# Patient Record
Sex: FEMALE
[De-identification: names, ages, dates, MRNs, and addresses within clinical notes are randomized per-mention and may not be internally consistent; named-entity substitution may affect disease eponyms.]

---
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
## 2018-06-04 ENCOUNTER — APPOINTMENT (OUTPATIENT)
Dept: INTERNAL MEDICINE | Facility: CLINIC | Age: 18
End: 2018-06-04

## 2019-01-04 ENCOUNTER — APPOINTMENT (OUTPATIENT)
Dept: INTERNAL MEDICINE | Facility: CLINIC | Age: 19
End: 2019-01-04
Payer: SELF-PAY

## 2019-01-04 VITALS — RESPIRATION RATE: 12 BRPM | DIASTOLIC BLOOD PRESSURE: 64 MMHG | HEART RATE: 72 BPM | SYSTOLIC BLOOD PRESSURE: 94 MMHG

## 2019-01-04 VITALS — BODY MASS INDEX: 28.17 KG/M2 | WEIGHT: 165 LBS | HEIGHT: 64 IN

## 2019-01-04 DIAGNOSIS — K65.9 PERITONITIS, UNSPECIFIED: ICD-10-CM

## 2019-01-04 DIAGNOSIS — Z78.9 OTHER SPECIFIED HEALTH STATUS: ICD-10-CM

## 2019-01-04 DIAGNOSIS — R10.31 RIGHT LOWER QUADRANT PAIN: ICD-10-CM

## 2019-01-04 PROCEDURE — 99213 OFFICE O/P EST LOW 20 MIN: CPT

## 2019-01-04 NOTE — HISTORY OF PRESENT ILLNESS
[FreeTextEntry1] : c/o sharp R sided pain and bloating several mos ago then got diarrhea x 2 wks.  OK till last PM when she had some R sided pain but not as severe.  Normal BM.  Concerned that she has an AP.  No fever chills.  \par Last GYn exam 5-18 was normal.  No known ovarian cysts.  No hx endometriosis.  \par No urinary sx's or bleeding.

## 2019-01-04 NOTE — HEALTH RISK ASSESSMENT
[No falls in past year] : Patient reported no falls in the past year [0] : 2) Feeling down, depressed, or hopeless: Not at all (0) [] : No [de-identified] : Rare social [[id number]] : 0

## 2019-01-04 NOTE — REVIEW OF SYSTEMS
[Headache] : headache [Negative] : Heme/Lymph [FreeTextEntry2] : see HPI [FreeTextEntry7] : see HPI [FreeTextEntry8] : see HPI [de-identified] : Had headache yesterday and "Migraine relief" (Excedrin)

## 2019-01-04 NOTE — PHYSICAL EXAM

## 2019-01-04 NOTE — ASSESSMENT
[FreeTextEntry1] : R sided abd pain.  More severe 2 mos ago.  Had diarrhea x 2 wks.  Not sure if related to menses.  No urinary sx's.  Generally normal BM's.  No sexually active.  Differential includes ruptured ovarian cyst and peritonitis vs endometriosis.  Doubt an acute or recurrent AP.  Needs abd sono and suggest GYN exam.  Pt has GYN in [place].\par Has no health ins but mother has a full time job.  \par It is possible that pt could go back to [facility] where she has student health ins.\par

## 2019-01-07 ENCOUNTER — APPOINTMENT (OUTPATIENT)
Dept: INTERNAL MEDICINE | Facility: CLINIC | Age: 19
End: 2019-01-07

## 2020-12-15 PROBLEM — Z11.1 ENCOUNTER FOR PPD TEST: Status: RESOLVED | Noted: 2017-10-31 | Resolved: 2020-12-15

## 2025-04-12 ENCOUNTER — TRANSCRIPTION ENCOUNTER (OUTPATIENT)
Age: 25
End: 2025-04-12